# Patient Record
Sex: MALE | URBAN - METROPOLITAN AREA
[De-identification: names, ages, dates, MRNs, and addresses within clinical notes are randomized per-mention and may not be internally consistent; named-entity substitution may affect disease eponyms.]

---

## 2020-08-05 ENCOUNTER — TELEPHONE (OUTPATIENT)
Dept: FAMILY MEDICINE CLINIC | Facility: CLINIC | Age: 55
End: 2020-08-05

## 2020-08-05 NOTE — TELEPHONE ENCOUNTER
pt has not been seen in over 3 years- previous dr was dr arnold - howard order to get covid testing - pt was sent home from work due to sxs and can not return until he gets tested

## 2020-08-05 NOTE — TELEPHONE ENCOUNTER
Spoke with wife. Patient sent home from work today for sore throat and chills. Patient told her he had chills last evening. They do not have a thermometer.   Patient and wife had close contact with  a friend for an hour on 7/25/20 who shortly after tested p

## 2020-08-05 NOTE — TELEPHONE ENCOUNTER
Chart reviewed   (last contact 12/2016 - appointment GISELLE Brown)     Recommend appointment - phone / video with any provider.

## 2020-08-06 ENCOUNTER — VIRTUAL PHONE E/M (OUTPATIENT)
Dept: FAMILY MEDICINE CLINIC | Facility: CLINIC | Age: 55
End: 2020-08-06
Payer: COMMERCIAL

## 2020-08-06 DIAGNOSIS — J02.9 SORE THROAT: ICD-10-CM

## 2020-08-06 DIAGNOSIS — Z20.822 EXPOSURE TO COVID-19 VIRUS: Primary | ICD-10-CM

## 2020-08-06 PROCEDURE — 99212 OFFICE O/P EST SF 10 MIN: CPT | Performed by: NURSE PRACTITIONER

## 2020-08-06 NOTE — TELEPHONE ENCOUNTER
Pt c/o of sore throat, possible fever, wants to be tested for covid, needs RTW note. Audio appt made. Guillermo GERONIMO  . Future appt:     Your appointments     Date & Time Appointment Department [facility])    Aug 06, 2020  3:00 PM CDT Virtual Phone Visit with Steven

## 2020-08-07 ENCOUNTER — TELEPHONE (OUTPATIENT)
Dept: FAMILY MEDICINE CLINIC | Facility: CLINIC | Age: 55
End: 2020-08-07

## 2020-08-07 NOTE — TELEPHONE ENCOUNTER
Patient wife states patient needs a letter stating that [facility] ordered patient to get a covid test today for work.  Please call when ready to be picked up

## 2020-08-07 NOTE — TELEPHONE ENCOUNTER
Patient's wife notified that the letter requested is ready for  at the . Patient's wife verbalized understanding and has no further questions or concerns.

## 2020-08-10 ENCOUNTER — TELEPHONE (OUTPATIENT)
Dept: FAMILY MEDICINE CLINIC | Facility: CLINIC | Age: 55
End: 2020-08-10

## 2020-08-10 NOTE — TELEPHONE ENCOUNTER
Spoke with patient's wife and advised that Covid test is negative. The patient would like to stay home this week to make sure that he is symptom free before returning back to work.      The patient is requesting a letter stating that he can return back t

## 2020-08-10 NOTE — TELEPHONE ENCOUNTER
Spoke with patient's wife and reviewed the letter with her. She will pick the letter up by the end of today. Patient's verbalized understanding and has no further questions or concerns.

## 2020-08-10 NOTE — TELEPHONE ENCOUNTER
According to [address] the patient's Covid test was negative on 8/7/20. Please respond with result and I will contact the patient.

## 2020-08-10 NOTE — TELEPHONE ENCOUNTER
Pelon let patient know letter and results are on one page, because it was done at [facility] we are not able to print a separate result note.  Thanks     [employer]

## 2020-08-11 NOTE — PROGRESS NOTES
HPI:    Patient ID: Radha Lema is a 48year old male. Radha Lema  verbally consents to a Virtual/Telephone Check-In service on 8/6/2020.     Patient understands and accepts financial responsibility for any deductible, co-insurance and/or co-pays associ

## 2020-11-04 ENCOUNTER — TELEPHONE (OUTPATIENT)
Dept: FAMILY MEDICINE CLINIC | Facility: CLINIC | Age: 55
End: 2020-11-04

## 2020-11-04 NOTE — TELEPHONE ENCOUNTER
Kevyn Bello  verbally consents to a Virtual/Telephone Check-In service on 115/2020. Patient understands and accepts financial responsibility for any deductible, co-insurance and/or co-pays associated with this service.           consent for PV 11/5   wit

## 2020-11-05 ENCOUNTER — VIRTUAL PHONE E/M (OUTPATIENT)
Dept: FAMILY MEDICINE CLINIC | Facility: CLINIC | Age: 55
End: 2020-11-05
Payer: COMMERCIAL

## 2020-11-05 DIAGNOSIS — R52 BODY ACHES: Primary | ICD-10-CM

## 2020-11-05 PROCEDURE — 99212 OFFICE O/P EST SF 10 MIN: CPT | Performed by: NURSE PRACTITIONER

## 2020-11-06 NOTE — PATIENT INSTRUCTIONS
Covid testing ordered for patient at [facility] per patient request.     If negative and symptoms worsen, may need to be retested. Quarantine until results. Go to ER if any respiratory distress.

## 2020-11-06 NOTE — PROGRESS NOTES
Telephone Check-In    Vito Walker verbally consents to a Virtual/Telephone Check-In service on 11/05/20. Patient understands and accepts financial responsibility for any deductible, co-insurance and/or co-pays associated with this service.     Duration of

## 2020-11-09 ENCOUNTER — TELEPHONE (OUTPATIENT)
Dept: FAMILY MEDICINE CLINIC | Facility: CLINIC | Age: 55
End: 2020-11-09

## 2020-11-09 NOTE — TELEPHONE ENCOUNTER
Spoke with wife Percell Elders regarding covid test and recommendations.  Mailed copy of lab results per wifes request

## 2020-11-09 NOTE — TELEPHONE ENCOUNTER
Same phone number as wife Sunshine Granado. Per CDC,   Persons with COVID-19 who have symptoms and were directed to care for themselves at home may discontinue isolation under the following conditions:     At least 10 days* have passed since symptom onset and  At

## 2020-11-18 ENCOUNTER — TELEPHONE (OUTPATIENT)
Dept: FAMILY MEDICINE CLINIC | Facility: CLINIC | Age: 55
End: 2020-11-18

## 2020-11-18 NOTE — TELEPHONE ENCOUNTER
Wife Kaitlynn Perez called back and discussed the CDC guidelines and repeat testing for negative is not recommended. Agrees to have a letter done stating that Ankit Zazueta can go back to work after 11/20. Original testing was 11/6.  Patient and wife are both on vacatio

## 2020-11-18 NOTE — TELEPHONE ENCOUNTER
Patients wife called. She would like him to get retested for covid to return. Work is only requiring clearance. Informed wife insurance may not pay for the test. Wife verbalized understanding and still asked for a covid order.

## (undated) NOTE — LETTER
Date: 8/10/2020    Patient Name: Byron Dotson          To Whom it may concern: The above patient was evaluated at the [facility] for treatment of a medical condition. This patient should be excused from attending work this week.  May ret

## (undated) NOTE — LETTER
11/18/2020          To Whom It May Concern:    Dong Arthur is currently under our medical care and may not return to work at this time. Please excuse Rabia Speaks from work for medical reasons. He may return to work on 11/21/20.   Activity is restricted as foll